# Patient Record
Sex: MALE | Employment: FULL TIME | URBAN - METROPOLITAN AREA
[De-identification: names, ages, dates, MRNs, and addresses within clinical notes are randomized per-mention and may not be internally consistent; named-entity substitution may affect disease eponyms.]

---

## 2019-04-09 ENCOUNTER — OFFICE VISIT (OUTPATIENT)
Dept: FAMILY MEDICINE | Facility: CLINIC | Age: 64
End: 2019-04-09
Payer: COMMERCIAL

## 2019-04-09 VITALS
BODY MASS INDEX: 27.84 KG/M2 | DIASTOLIC BLOOD PRESSURE: 82 MMHG | WEIGHT: 177.4 LBS | RESPIRATION RATE: 16 BRPM | HEART RATE: 84 BPM | HEIGHT: 67 IN | SYSTOLIC BLOOD PRESSURE: 126 MMHG | TEMPERATURE: 97.9 F | OXYGEN SATURATION: 99 %

## 2019-04-09 DIAGNOSIS — M72.0 DUPUYTREN'S CONTRACTURE OF HAND: ICD-10-CM

## 2019-04-09 DIAGNOSIS — Z87.891 PERSONAL HISTORY OF TOBACCO USE: ICD-10-CM

## 2019-04-09 DIAGNOSIS — Z00.01 ENCOUNTER FOR ROUTINE ADULT MEDICAL EXAM WITH ABNORMAL FINDINGS: Primary | ICD-10-CM

## 2019-04-09 DIAGNOSIS — B18.2 CHRONIC HEPATITIS C WITHOUT HEPATIC COMA (H): ICD-10-CM

## 2019-04-09 DIAGNOSIS — Z13.6 SCREENING FOR AAA (ABDOMINAL AORTIC ANEURYSM): ICD-10-CM

## 2019-04-09 LAB
ALBUMIN SERPL-MCNC: 3.8 G/DL (ref 3.2–4.5)
ALP SERPL-CCNC: 68 U/L (ref 40–150)
ALT SERPL-CCNC: 23 U/L (ref 0–45)
AST SERPL-CCNC: 27 U/L (ref 0–55)
BILIRUB SERPL-MCNC: 0.5 MG/DL (ref 0.2–1.3)
BUN SERPL-MCNC: 33 MG/DL (ref 7–30)
CALCIUM SERPL-MCNC: 9.8 MG/DL (ref 8.5–10.4)
CHLORIDE SERPLBLD-SCNC: 105 MMOL/L (ref 94–109)
CHOLEST SERPL-MCNC: 268 MG/DL
CHOLEST/HDLC SERPL: 6.1 RATIO
CO2 SERPL-SCNC: 30 MMOL/L (ref 20–32)
CREAT SERPL-MCNC: 1.9 MG/DL (ref 0.8–1.5)
EGFR CALCULATED (BLACK REFERENCE): 46 ML/MIN
EGFR CALCULATED (NON BLACK REFERENCE): 38 ML/MIN
GLUCOSE SERPL-MCNC: 109 MG/DL (ref 60–109)
HDLC SERPL-MCNC: 44 MG/DL
HIV 1+2 AB+HIV1 P24 AG SERPL QL IA: NEGATIVE
LDLC SERPL CALC-MCNC: 184 MG/DL (ref 0–99)
POTASSIUM SERPL-SCNC: 4.7 MMOL/L (ref 3.4–5.3)
PROT SERPL-MCNC: 7.8 G/DL (ref 6.6–8.8)
SODIUM SERPL-SCNC: 141 MMOL/L (ref 133–144)
TRIGL SERPL-MCNC: 201 MG/DL
VLDL-CHOLESTEROL: 40 MG/DL (ref 7–32)

## 2019-04-09 RX ORDER — DOXEPIN HYDROCHLORIDE 150 MG/1
10-20 CAPSULE ORAL
COMMUNITY
Start: 2019-04-09

## 2019-04-09 RX ORDER — DULOXETIN HYDROCHLORIDE 60 MG/1
120 CAPSULE, DELAYED RELEASE ORAL DAILY
COMMUNITY
Start: 2019-04-09

## 2019-04-09 ASSESSMENT — MIFFLIN-ST. JEOR: SCORE: 1548.31

## 2019-04-09 NOTE — PATIENT INSTRUCTIONS
Preventive Health Recommendations:     See your health care provider every year to    Review health changes.     Discuss preventive care.      Review your medicines if your doctor has prescribed any.      Talk with your health care provider about whether you should have a test to screen for prostate cancer (PSA).    Every 3 years, have a diabetes test (fasting glucose). If you are at risk for diabetes, you should have this test more often.    Every 5 years, have a cholesterol test. Have this test more often if you are at risk for high cholesterol or heart disease.     Every 10 years, have a colonoscopy. Or, have a yearly FIT test (stool test). These exams will check for colon cancer.    Talk to with your health care provider about screening for Abdominal Aortic Aneurysm if you have a family history of AAA or have a history of smoking.    Shots:     Get a flu shot each year.     Get a tetanus shot every 10 years.     Talk to your doctor about your pneumonia vaccines. There are now two you should receive - Pneumovax (PPSV 23) and Prevnar (PCV 13).     Talk to your pharmacist about a shingles vaccine.     Talk to your doctor about the hepatitis B vaccine.  Nutrition:     Eat at least 5 servings of fruits and vegetables each day.     Eat whole-grain bread, whole-wheat pasta and brown rice instead of white grains and rice.     Get adequate Calcium and Vitamin D.   Lifestyle    Exercise for at least 150 minutes a week (30 minutes a day, 5 days a week). This will help you control your weight and prevent disease.     Limit alcohol to one drink per day.     No smoking.     Wear sunscreen to prevent skin cancer.    See your dentist every six months for an exam and cleaning.    See your eye doctor every 1 to 2 years to screen for conditions such as glaucoma, macular degeneration, cataracts, etc.    Personalized Prevention Plan  You are due for the preventive services outlined below.  Your care team is available to assist you  in scheduling these services.  If you have already completed any of these items, please share that information with your care team to update in your medical record.  Health Maintenance Due   Topic Date Due     Depression Assessment 2 - yearly  03/05/1966     HIV SCREEN (SYSTEM ASSIGNED)  03/05/1972     Hepatitis C Screening  03/05/1972     Diptheria Tetanus Pertussis (DTAP/TDAP/TD) Vaccine (1 - Tdap) 03/05/1979     Cholesterol Lab - every 5 years  03/05/1989     Colon Cancer Screening - every 10 years.  03/05/2004     Zoster (Shingles) Vaccine (1 of 2) 03/05/2004     Discuss Advance Directive Planning  03/05/2009     Flu Vaccine (1) 09/01/2018     Annual Wellness Visit  03/05/2019     FALL RISK ASSESSMENT  03/05/2019     Pneumococcal Vaccine (1 of 2 - PCV13) 03/05/2019     AORTIC ANEURYSM SCREENING (SYSTEM ASSIGNED)  03/05/2019     Lung Cancer Screening   Frequently Asked Questions  If you are at high-risk for lung cancer, getting screened with low-dose computed tomography (LDCT) every year can help save your life. This handout offers answers to some of the most common questions about lung cancer screening. If you have other questions, please call [phone] ([phone]).     What is it?  Lung cancer screening uses special X-ray technology to create an image of your lung tissue. The exam is quick and easy and takes less than 10 seconds. We don t give you any medicine or use any needles. You can eat before and after the exam. You don t need to change your clothes as long as the clothing on your chest doesn t contain metal. But, you do need to be able to hold your breath for at least 6 seconds during the exam.    What is the goal of lung cancer screening?  The goal of lung cancer screening is to save lives. Many times, lung cancer is not found until a person starts having physical symptoms. Lung cancer screening can help detect lung cancer in the earliest stages when it may be easier to treat.    Who should  be screened for lung cancer?  We suggest lung cancer screening for anyone who is at high-risk for lung cancer. You are in the high-risk group if you:      are between the ages of 55 and 79, and    have smoked at least 1 pack of cigarettes a day for 30 or more years, and    still smoke or have quit within the past 15 years.    However, if you have a new cough or shortness of breath, you should talk to your doctor before being screened.    Some national lung health advocacy groups also recommend screening for people ages 50 to 79 who have smoked an average of 1 pack of cigarettes a day for 20 years. They must also have at least 1 other risk factor for lung cancer, not including exposure to secondhand smoke. Other risk factors are having had cancer in the past, emphysema, pulmonary fibrosis, COPD, a family history of lung cancer, or exposure to certain materials such as arsenic, asbestos, beryllium, cadmium, chromium, diesel fumes, nickel, radon or silica. Your care team can help you know if you have one of these risk factors.     Why does it matter if I have symptoms?  Certain symptoms can be a sign that you have a condition in your lungs that should be checked and treated by your doctor. These symptoms include fever, chest pain, a new or changing cough, shortness of breath that you have never felt before, coughing up blood or unexplained weight loss. Having any of these symptoms can greatly affect the results of lung cancer screening.       Should all smokers get an LDCT lung cancer screening exam?  It depends. Lung cancer screening is for a very specific group of men and women who have a history of heavy smoking over a long period of time (see  Who should be screened for lung cancer  above).  I am in the high-risk group, but have been diagnosed with cancer in the past. Is LDCT lung cancer screening right for me?  In some cases, you should not have LDCT lung screening, such as when your doctor is already following  your cancer with CT scan studies. Your doctor will help you decide if LDCT lung screening is right for you.  Do I need to have a screening exam every year?  Yes. If you are in the high-risk group described earlier, you should get an LDCT lung cancer screening exam every year until you are 79, or are no longer willing or able to undergo screening and possible procedures to diagnose and treat lung cancer.  How effective is LDCT at preventing death from lung cancer?  Studies have shown that LDCT lung cancer screening can lower the risk of death from lung cancer by 20 percent in people who are at high-risk.  What are the risks?  There are some risks and limitations of LDCT lung cancer screening. We want to make sure you understand the risks and benefits, so please let us know if you have any questions. Your doctor may want to talk with you more about these risks.    Radiation exposure: As with any exam that uses radiation, there is a very small increased risk of cancer. The amount of radiation in LDCT is small--about the same amount a person would get from a mammogram. Your doctor orders the exam when he or she feels the potential benefits outweigh the risks.    False negatives: No test is perfect, including LDCT. It is possible that you may have a medical condition, including lung cancer, that is not found during your exam. This is called a false negative result.    False positives and more testing: LDCT very often finds something in the lung that could be cancer, but in fact is not. This is called a false positive result. False positive tests often cause anxiety. To make sure these findings are not cancer, you may need to have more tests. These tests will be done only if you give us permission. Sometimes patients need a treatment that can have side effects, such as a biopsy. For more information on false positives, see  What can I expect from the results?     Findings not related to lung cancer: Your LDCT exam also  takes pictures of areas of your body next to your lungs. In a very small number of cases, the CT scan will show an abnormal finding in one of these areas, such as your kidneys, adrenal glands, liver or thyroid. This finding may not be serious, but you may need more tests. Your doctor can help you decide what other tests you may need, if any.  What can I expect from the results?  About 1 out of 4 LDCT exams will find something that may need more tests. Most of the time, these findings are lung nodules. Lung nodules are very small collections of tissue in the lung. These nodules are very common, and the vast majority--more than 97 percent--are not cancer (benign). Most are normal lymph nodes or small areas of scarring from past infections.  But, if a small lung nodule is found to be cancer, the cancer can be cured more than 90 percent of the time. To know if the nodule is cancer, we may need to get more images before your next yearly screening exam. If the nodule has suspicious features (for example, it is large, has an odd shape or grows over time), we will refer you to a specialist for further testing.  Will my doctor also get the results?  Yes. Your doctor will get a copy of your results.  Is it okay to keep smoking now that there s a cancer screening exam?  No. Tobacco is one of the strongest cancer-causing agents. It causes not only lung cancer, but other cancers and cardiovascular (heart) diseases as well. The damage caused by smoking builds over time. This means that the longer you smoke, the higher your risk of disease. While it is never too late to quit, the sooner you quit, the better.  Where can I find help to quit smoking?  The best way to prevent lung cancer is to stop smoking. If you have already quit smoking, congratulations and keep it up! For help on quitting smoking, please call [employer] at 8-289-875-QQBN (9431) or the American Cancer Society at 1-750.855.9148 to find local resources near  you.  One-on-one health coaching:  If you d prefer to work individually with a health care provider on tobacco cessation, we offer:      Medication Therapy Management:  Our specially trained pharmacists work closely with you and your doctor to help you quit smoking.  Call [phone] or [phone] (toll free).     Can Do: Health coaching offered by [facility].  [email or web address]

## 2019-04-09 NOTE — PROGRESS NOTES
Preceptor Attestation:   Patient seen, evaluated and discussed with the resident. I have verified the content of the note, which accurately reflects my assessment of the patient and the plan of care.  Supervising Physician:Jose Suarez MD  [facility]

## 2019-04-09 NOTE — PROGRESS NOTES
Male Physical Note    Concerns today: Left hand soreness in the palm, going on for a long time. No medicines or surgery.    Also, hasn't had sex in 5-6 years. States that ever since he put a gun to the head and pulled the trigger he can't get an erection. No erection when he wakes up, but he also states he doesn't sleep very well.    Used to be on BP medications, but then his BP went too low and he passed out in a parking lot.    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    2 past suicide attempts, last 5-6 years ago and the first was in the 70s. [facility] - no recent medication changes. Sees them monthly for the below.    Used to be a heroin addict, [facility].    Has quit cigarettes in the past.    Patient with a history of HCV, was treated and told he was cured.    Lung Cancer Screening Shared Decision Making Visit     Arley Marrero is eligible for lung cancer screening on the basis of the information provided in my signed lung cancer screening order.     I have discussed with patient the risks and benefits of screening for lung cancer with low-dose CT.     The risks include:  radiation exposure: one low dose chest CT has as much ionizing radiation as about 15 chest x-rays or 6 months of background radiation living in [place]    false positives: 96% of positive findings/nodules are NOT cancer, but some might still require additional diagnostic evaluation, including biopsy  over-diagnosis: some slow growing cancers that might never have been clinically significant will be detected and treated unnecessarily     The benefit of early detection of lung cancer is contingent upon adherence to annual screening or more frequent follow up if indicated.     Furthermore, reaping the benefits of screening requires Arley Marrero to be willing and physically able to undergo diagnostic procedures, if indicated.  Although no specific guide is available for determining severity of comorbidities, it is reasonable to withhold screening in patients who have greater mortality risk from other diseases.     We did discuss that the only way to prevent lung cancer is to not smoke. Smoking cessation assistance was offered.    I did not offer risk estimation using a calculator such as this one:    ROS:                      CONSTITUTIONAL: no fatigue, no unexpected change in weight  SKIN: no worrisome rashes, no worrisome moles, no worrisome lesions  EYES: no acute vision problems or changes  ENT: no ear problems, no mouth problems, no throat problems  RESP: no significant cough, no shortness of breath  CV: no chest pain, no palpitations, no new or worsening peripheral edema  GI: no nausea, no vomiting, no constipation, no diarrhea    Past Medical History:   Diagnosis Date     Heroin abuse (H)      Suicide attempt (H)      History reviewed. No pertinent family history.             Family History and past Medical History reviewed and unchanged/updated.    Social History     Tobacco Use     Smoking status: Current Every Day Smoker     Smokeless tobacco: Never Used   Substance Use Topics     Alcohol use: Not on file       Children ? yes Tracy (40) and Balbina (39)    Has anyone hurt you physically, for example by pushing, hitting, slapping or kicking you or forcing you to have sex? Denies  Do you feel threatened or controlled by a partner, ex-partner or anyone in your life? Denies    RISK BEHAVIORS AND HEALTHY HABITS:  Tobacco Use/Smoking: Currently smokes 1.5 packs per week  Illicit Drug Use: None  ETOH: None  Do you use alcohol? No  Sexually Active: No  Diet (5-7 servings of fruits/veg daily): No   Exercise (30 min accumulated most days):No  Dental Care: last went last year, needs some teeth pulled   Calcium 1500 mg/d:  No  Seat Belt Use: Yes     Cholesterol Level (>46 yo or at risk):  Recommended and patient accepted testing. and  "HIV screening:  Date done 1980s-1990s  Result(s) Negative  Colon CA Screening (>50-75 ):  Recommended and patient declined testing., US for AAA (men 65-74 yo who ever smoked):  Recommended and patient accepted testing. and Lung CA Screening with low dose CT (55 - 80, with >= 30 ppy smoking history who are current smokers or quit within last 15y - annual low dose CT) Recommended and patient accepted testing.    Patient unsure of immunization history, [facility]    Immunization History   Administered Date(s) Administered     Influenza (IIV3) PF 10/13/2009, 11/29/2010, 09/30/2011     Pneumococcal 23 valent 10/13/2009     TDAP Vaccine (Boostrix) 05/18/2011     Tdap (Adult) Unspecified Formulation 05/01/2007      EXAMINATION:  /82   Pulse 84   Temp 97.9  F (36.6  C) (Oral)   Resp 16   Ht 1.702 m (5' 7\")   Wt 80.5 kg (177 lb 6.4 oz)   SpO2 99%   BMI 27.78 kg/m    GENERAL: healthy, alert and no distress  EYES: Eyes grossly normal to inspection, extraocular movements - intact, and PERRL  HENT: ear canals- normal; TMs- normal; Nose- normal; Mouth- no ulcers, no lesions  RESP: lungs clear to auscultation - no rales, no rhonchi, no wheezes  CV: regular rates and rhythm, normal S1 S2, no S3 or S4 and no murmur, no click or rub -  ABDOMEN: soft, no tenderness, no  hepatosplenomegaly, no masses, normal bowel sounds  MS: extremities- Dupuytren's contracture L hand 3rd finger, no other gross deformities noted, no edema  SKIN: no suspicious lesions, no rashes  NEURO: strength and tone- normal, sensory exam- grossly normal, mentation- intact, speech- normal, reflexes- symmetric  : declined  PSYCH: Alert and oriented times 3; speech- coherent , normal rate and volume; able to articulate logical thoughts, able to abstract reason, no tangential thoughts, no hallucinations or delusions, affect- normal    ASSESSMENT/PLAN:  1. Encounter for routine adult medical exam with abnormal " findings  Patient believes he has never had a lipid panel, last tested for HIV in the 1990s although he used IV heroin since.  - Lipid Panel ([facility]) - Results < 1 hr  - HIV Ag/Ab Screen [facility] ([facility])    2. Dupuytren's contracture of hand  Not interested in injection or surgery at this time, will continue to follow.    3. Chronic hepatitis C without hepatic coma (H)  Patient states he got from IV heroin and was treated, will confirm this with the below.  - Hepatits C RNA by RT-PCR ([facility])  - Hepatitis C Antibody ([facility])  - Comprehensive Metabolic Panel ([facility]) - results <1hr Protocol    4. Screening for AAA (abdominal aortic aneurysm)  Meets screening criteria.  - Abdominal Aortic Aneurysm Screening/Tracking    5. Personal history of tobacco use  Meets screening criteria for lung cancer, not interested in quitting today - has quit successfully in the past.  - Prof fee: Shared Decisionmaking for Lung Cancer Screening  - CT Chest Lung Cancer Scrn Low Dose wo; Future  - Okay for Smoking Cessation Study (PLUTO) to Contact Patient    F/U in 1 month    Stefan Salgado MD  [facility], PGY-2    Precepted with Dr. Suarez.

## 2019-04-09 NOTE — LETTER
April 12, 2019      Arley Marrero  [id number]  [place] [address]        Dear Arley,    Your Hepatitis C tests show that you did indeed have the disease, but were successfully treated. Your HIV test was negative. Your kidney function is reduced, but this has been stable since 2017. Your cholesterol was elevated and I would recommend starting a medication to lower this which I have sent to your pharmacy. We can discuss this further at your next visit.    Please see below for your test results.    Resulted Orders   Lipid Panel ([facility]) - Results < 1 hr   Result Value Ref Range    Cholesterol 268.0 (H) <200.0 mg/dL    Triglycerides 201.0 (H) <150.0 mg/dL    HDL Cholesterol 44.0 >40.0 mg/dL      Comment:      If diabetic or CVD then reference range <100    VLDL-Cholesterol 40.0 (H) 7.0 - 32.0 mg/dL    LDL Cholesterol Direct 184.0 (H) 0.0 - 99.0 mg/dL    Cholesterol/HDL Ratio 6.1 (H) <5.0 RATIO   HIV Ag/Ab Screen [facility] (Apliiq)   Result Value Ref Range    HIV Antigen/Antibody Negative Negative    Narrative    Test performed by:  [facility] [address] [place] [address]  Method is Abbott HIV Ag/Ab for the detection of HIV p24 antigen, HIV-1   antibodies and HIV-2 antibodies.   Hepatits C RNA by RT-PCR ([facility]iiq)   Result Value Ref Range    HCV RNA QUANT HCV RNA Not Detected HCVND [IU]/mL      Comment:      The DAVE AmpliPrep/DAVE TaqMan HCV Test is an FDA-approved in vitro nucleic  acid amplification test for the quantitation of HCV RNA in human plasma (ETDA  plasma) or serum using the DAVE AmpliPrep Instrument for automated viral  nucleic acid extraction and the DAVE TaqMan Analyzer or DAVE TaqMan for  automated Real Time PCR amplification and detection of the viral nucleic acid  target.  Titer results are reported in International Units/mL (IU/mL) using the 1st WHO  International standard for HCV for Nucleic Acid Amplification based assays.      LOG OF HCV RNA QT Not  Calculated <1.2 Log IU/mL      Comment:      Performed and/or entered by:  [facility]  [address] [place] [address]      Narrative    Test performed by:  [facility]  [address], [place] [address]   Hepatitis C Antibody ([facility])   Result Value Ref Range    Hepatitis C Antibody Screen Positive (A) Negative    Narrative    Test performed by:  [facility]  [address] [place] [address]   Comprehensive Metabolic Panel ([facility]) - results <1hr Protocol   Result Value Ref Range    Glucose 109.0 60.0 - 109.0 mg/dL    Urea Nitrogen 33.0 (H) 7.0 - 30.0 mg/dL    Creatinine 1.9 (H) 0.8 - 1.5 mg/dL    Sodium 141.0 133.0 - 144.0 mmol/L    Potassium 4.7 3.4 - 5.3 mmol/L    Chloride 105.0 94.0 - 109.0 mmol/L    Carbon Dioxide 30.0 20.0 - 32.0 mmol/L    Calcium 9.8 8.5 - 10.4 mg/dL    Protein Total 7.8 6.6 - 8.8 g/dL    Albumin 3.8 3.2 - 4.5 g/dL    Alkaline Phosphatase 68.0 40.0 - 150.0 U/L    ALT 23.0 0.0 - 45.0 U/L    AST 27.0 0.0 - 55.0 U/L    Bilirubin Total 0.5 0.2 - 1.3 mg/dL    eGFR Calculated (Non Black Reference) 38.0 (L) >60.0 mL/min    eGFR Calculated (Black Reference) 46.0 (L) >60.0 mL/min       If you have any questions, please call the clinic to make an appointment.    Sincerely,    Stefan Salgado MD

## 2019-04-10 LAB — HCV AB SER QL: POSITIVE

## 2019-04-11 LAB
HCV RNA QUANT: NORMAL [IU]/ML
LOG OF HCV RNA QT: NORMAL LOG IU/ML

## 2019-04-12 DIAGNOSIS — E78.5 HYPERLIPIDEMIA LDL GOAL <70: Primary | ICD-10-CM

## 2019-04-12 RX ORDER — ATORVASTATIN CALCIUM 40 MG/1
40 TABLET, FILM COATED ORAL DAILY
Qty: 90 TABLET | Refills: 0 | Status: SHIPPED | OUTPATIENT
Start: 2019-04-12 | End: 2019-07-18

## 2019-04-12 NOTE — RESULT ENCOUNTER NOTE
Please mail a letter to the patient with the following:    Mr. Marrero,    Below are your lab results from your recent visit. Your Hepatitis C tests show that you did indeed have the disease, but were successfully treated. Your HIV test was negative. Your kidney function is reduced, but this has been stable since 2017. Your cholesterol was elevated and I would recommend starting a medication to lower this which I have sent to your pharmacy. We can discuss this further at your next visit.    If you have any further questions or concerns, please do not hesitate to reach out to my office.    I look forward to seeing you in the future!    Take care,  Stefan Salgado MD

## 2019-04-18 ENCOUNTER — TELEPHONE (OUTPATIENT)
Dept: FAMILY MEDICINE | Facility: CLINIC | Age: 64
End: 2019-04-18

## 2019-04-18 NOTE — TELEPHONE ENCOUNTER
Called medication into [facility] pharmacy per patient request. Called patient to update on prescription sent, no answer. Left  updating on prescription sent to pharmacy, requested call into clinic with questions or concerns. Alex MAJOR

## 2019-04-18 NOTE — TELEPHONE ENCOUNTER
[facility] phone call message- medication clarification/question:    Full Medication Name: lipitor   Dose: 40mg    Question: Patient has insurance that does not cover [facility]. Patient wants medication to be sent to [facility] on [address] and [address]     Pharmacy confirmed as [id number] - [place], [place] - [address]: No    OK to leave a message on voice mail? Yes    Primary language: English      needed? No    Call taken on April 18, 2019 at 4:20 PM by Thien Meyer

## 2019-04-18 NOTE — TELEPHONE ENCOUNTER
Patient was returning call for MORIAH Martin. I notified the patient of message from MORIAH Martin. Patient states the pharmacy she mentioned is correct and he would like all future medications to be sent to [facility].

## 2019-04-27 ENCOUNTER — ANCILLARY PROCEDURE (OUTPATIENT)
Dept: CT IMAGING | Facility: CLINIC | Age: 64
End: 2019-04-27
Attending: FAMILY MEDICINE
Payer: COMMERCIAL

## 2019-04-27 DIAGNOSIS — Z87.891 PERSONAL HISTORY OF TOBACCO USE: ICD-10-CM

## 2019-04-27 NOTE — LETTER
2019      Arley Marrero  [id number]  [place] [address]        Dear Arley,    Below are your imaging results from your CT scan. It was negative for cancer, but you did have a lung nodule that should be followed up with a repeat CT scan of your chest in 6 months. We can discuss these results further at your next visit.    Please see below for your test results.    Resulted Orders   CT Chest Lung Cancer Scrn Low Dose wo   Result Value Ref Range    Radiologist flags Lung nodule     Narrative    CT Low Dose Lung Cancer Screening    History: Screening for lung cancer, smoking.    Number of packs-year of smokin  Current or former smoker?: Current  If former, number of years since quit?: N/A    Comparison: None    Technique: Flash acquisition low dose CT chest. Images reviewed in  lung, soft tissue and bone windows.  DLP: 84 (mGy*cm)  CTDIvol: 2.2 (mGy)    Findings: [All follow up of nodules are based on ACR guidelines for  lung cancer screening and measurements of each nodule size must be the  mean of the longest 2 axial plane perpendiculars]  Nodules: ~10  The largest and/or fastest 4 of these nodule(s) are as follows:    - 20 x 20 mm part solid-part ground glass (sizes of both solid and gg  component) nodule in the right upper lobe with the solid component  measuring 5 x 3 mm on series:  2 image:  129.    - 12 x 11 mm ground glass nodule in the right middle lobe on series:   2 image:  232.    - 11 x 10 mm ground glass nodule in the right middle lobe on series:   2 image:  219.    - 4 x 3 mm solid nodule left upper lobe on series 2 image 147    Emphysema: Minimal centrilobular emphysema.    Mosaic attenuation: No    Bronchial wall thickening: Mild.    Bronchiectasis: No    Coronary artery calcium: Minimal calcium in the proximal left anterior  descending coronary artery.    Other portions of the chest: Diffuse centrilobular groundglass  opacities. No pneumothorax or pleural effusion. Small  "amount of  layering debris within the trachea. No pericardial effusion. Normal  caliber of the thoracic aorta.     Lymph nodes: No enlarged thoracic lymph nodes by size criteria.    Upper abdomen: Postoperative changes from cholecystectomy. Partial  pancreatic lipomatosis.     Bones: No worrisome osseous abnormality.      Impression    Impression:   1. ACR Assessment Category:  Lung-RADS Category 3. Probably benign  finding(s)     Recommendation:  Lung-RADS Category 3. Probably benign finding(s)-  short term follow up suggested 6 month low dose CT Chest without  contrast (please order exam code [id number]).      2. Significant Incidental Finding(s):  Category S: Yes.  a.  other interstitial lung disease (specify type if known): smoking  related interstitial lung disease, favor respiratory bronchiolitis.    3. Prior history of Lung cancer:  Category C: no.    4. Avoidance of tobacco smoke is strongly advised. Please consider  referral for smoking cessation to [facility] Medication Therapy Management  (MTM) if clinically appropriate.      Download the \"LungRADS Assessment Categories\" table at this site:   http://www.acr.org/Quality-Safety/Resources/LungRADS      [Recommend Follow Up: Lung nodule]    This report will be copied to the [facility] to ensure a  provider acknowledges the finding.     I have personally reviewed the examination and initial interpretation  and I agree with the findings.    ANIYAH SOUSA MD       If you have any questions, please call the clinic to make an appointment.    Sincerely,    [facility] CT ROOM 1  "

## 2019-04-29 ENCOUNTER — TELEPHONE (OUTPATIENT)
Dept: FAMILY MEDICINE | Facility: CLINIC | Age: 64
End: 2019-04-29

## 2019-04-29 LAB — RADIOLOGIST FLAGS: NORMAL

## 2019-04-29 NOTE — TELEPHONE ENCOUNTER
Deann will also fax to me final report of CT chest low dose lung cancer screening. Final result- right, mid lobe nodule. Recommendation- follow up in 6 months with low dose CT of chest without contrast. Message routed to Dr Salgado for review. Sunil MAJOR

## 2019-04-29 NOTE — RESULT ENCOUNTER NOTE
Please mail a letter to the patient with the following:    Arley,    Below are your imaging results from your CT scan. It was negative for cancer, but you did have a lung nodule that should be followed up with a repeat CT scan of your chest in 6 months. We can discuss these results further at your next visit.    If you have any further questions or concerns, please do not hesitate to reach out to my office.    I look forward to seeing you in the future!    Take care,  Stefan Salgado MD

## 2019-04-30 ENCOUNTER — TELEPHONE (OUTPATIENT)
Dept: FAMILY MEDICINE | Facility: CLINIC | Age: 64
End: 2019-04-30

## 2019-04-30 DIAGNOSIS — R91.8 ABNORMAL CT LUNG SCREENING: ICD-10-CM

## 2019-04-30 NOTE — TELEPHONE ENCOUNTER
[facility] Radiology Lung Cancer Screening CT result notification:     LDCT/Lung Cancer Screening CT Exam date: 4/27/19  Radiologist Impression AND Recommendations:   ACR Assessment Category:  Lung-RADS Category 3. Probably benign finding(s)   Recommendation:  Lung-RADS Category 3. Probably benign finding(s)- short term follow up suggested 6 month low dose CT Chest without contrast (please order exam code [id number]).     Pertinent Findings:  Nodules: ~10  The largest and/or fastest 4 of these nodule(s) are as follows:  - 20 x 20 mm part solid-part ground glass (sizes of both solid and gg component) nodule in the right upper lobe with the solid component measuring 5 x 3 mm on series:  2 image:  129.  - 12 x 11 mm ground glass nodule in the right middle lobe on series: 2 image:  232.  - 11 x 10 mm ground glass nodule in the right middle lobe on series: 2 image:  219.  - 4 x 3 mm solid nodule left upper lobe on series 2 image 147     Ordering Provider: Jose Suarez MD Michael R Stennett did receive the remaining radiology results from her provider.   CT Chest order placed to be completed within 6 months (Yes/No):  Yes   RN communicated the lung nodule finding to the patient (Yes/No):  No, Left message requesting call back  RN ordered Lung nodule program referral (Yes/No/NA): No  The patient had the following questions: NA  Correct letter sent as per Lung nodule protocol (Yes/No):  Yes  Did Patient have any CT's of chest previous? (inquire only if no CT's were used for comparison) (Yes/No/NA) no comparison's used    If scheduling LDCT only, RN will contact Image Scheduling Team  Hours available (all sites below):  Mon-Fri 7A to 8P; Sat 7A to 3P.  No schedulers available on Sunday.    [facility] ([place] and [place]): [phone]    [place] ([place], [place], [place]): [phone]    South region ([facility]): [phone]    Left voicemail message requesting a call back to [phone]  between 10 a.m. and 6:30 p.m. to discuss radiology finding.    [facility] RN  Lung Nodule and ED Lab Result F/u RN  Ph# [phone]

## 2019-04-30 NOTE — TELEPHONE ENCOUNTER
Arley notified of CT result and recommendation.  CT order placed (due on or after 10/30/19) and scheduling will call him 1 month prior to schedule this CT.    Arley has no other questions at this time.  Letter about Lung nodules has been sent and he is aware of this letter.,    Amrit Chisholm RN  [facility] RN  Lung Nodule and ED Lab Result RN  Epic pool (ED late result f/u RN): [id number]  [facility] INCIDENTAL RADIOLOGY F/U NURSES: P [id number]  # [phone]

## 2019-05-15 ENCOUNTER — OFFICE VISIT (OUTPATIENT)
Dept: FAMILY MEDICINE | Facility: CLINIC | Age: 64
End: 2019-05-15
Payer: COMMERCIAL

## 2019-05-15 ENCOUNTER — RECORDS - HEALTHEAST (OUTPATIENT)
Dept: ADMINISTRATIVE | Facility: OTHER | Age: 64
End: 2019-05-15

## 2019-05-15 VITALS
SYSTOLIC BLOOD PRESSURE: 108 MMHG | TEMPERATURE: 97 F | BODY MASS INDEX: 27.78 KG/M2 | HEART RATE: 89 BPM | WEIGHT: 177 LBS | OXYGEN SATURATION: 97 % | RESPIRATION RATE: 20 BRPM | DIASTOLIC BLOOD PRESSURE: 76 MMHG | HEIGHT: 67 IN

## 2019-05-15 DIAGNOSIS — E78.5 HYPERLIPIDEMIA LDL GOAL <70: ICD-10-CM

## 2019-05-15 DIAGNOSIS — F33.42 RECURRENT MAJOR DEPRESSIVE DISORDER, IN FULL REMISSION (H): ICD-10-CM

## 2019-05-15 DIAGNOSIS — Z13.6 ENCOUNTER FOR ABDOMINAL AORTIC ANEURYSM (AAA) SCREENING: ICD-10-CM

## 2019-05-15 DIAGNOSIS — Z72.0 TOBACCO ABUSE: ICD-10-CM

## 2019-05-15 DIAGNOSIS — R91.8 PULMONARY NODULES: Primary | ICD-10-CM

## 2019-05-15 PROBLEM — G47.00 INSOMNIA, UNSPECIFIED TYPE: Status: ACTIVE | Noted: 2019-05-15

## 2019-05-15 PROBLEM — K59.00 CONSTIPATION, UNSPECIFIED CONSTIPATION TYPE: Status: ACTIVE | Noted: 2019-05-15

## 2019-05-15 RX ORDER — SENNA AND DOCUSATE SODIUM 50; 8.6 MG/1; MG/1
1 TABLET, FILM COATED ORAL 2 TIMES DAILY PRN
COMMUNITY
Start: 2019-05-15

## 2019-05-15 RX ORDER — OMEPRAZOLE 20 MG/1
40 TABLET, DELAYED RELEASE ORAL DAILY
COMMUNITY
Start: 2019-05-15

## 2019-05-15 RX ORDER — DIPHENHYDRAMINE HCL 50 MG
50 CAPSULE ORAL
COMMUNITY
Start: 2019-05-15

## 2019-05-15 ASSESSMENT — PATIENT HEALTH QUESTIONNAIRE - PHQ9: SUM OF ALL RESPONSES TO PHQ QUESTIONS 1-9: 4

## 2019-05-15 ASSESSMENT — MIFFLIN-ST. JEOR: SCORE: 1545.37

## 2019-05-15 NOTE — PROGRESS NOTES
Preceptor Attestation:   Patient seen, evaluated and discussed with the resident. I have verified the content of the note, which accurately reflects my assessment of the patient and the plan of care.  Supervising Physician:Shivani Santos MD  [facility]

## 2019-05-15 NOTE — PATIENT INSTRUCTIONS
Referral for ( TEST )  :      US AAA Screening   LOCATION/PLACE/Provider :    [facility]   DATE & TIME :     5- at 11:00  PHONE :     [phone]  FAX :     [phone]  Appointment made by clinic staff/:    Renata

## 2019-05-15 NOTE — PROGRESS NOTES
Assessment and Plan   1. Pulmonary nodules  F/u imaging ordered.  - CT Chest w/o Contrast; Future    2. Encounter for abdominal aortic aneurysm (AAA) screening  - [place] [employer]ORTA MEDICARE AAA SCREENING; Future    3. Recurrent major depressive disorder, in full remission (H)  Currently stable, PHQ-9 of 3, JADYN-7 of 0.    4. Tobacco abuse  See HPI.    5. Hyperlipidemia LDL goal <70  Recheck cholesterol at next visit.    Follow up in 3 months.    Options for treatment and follow-up care were reviewed with the patient and/or guardian. Arley Marrero and/or guardian engaged in the decision making process and verbalized understanding of the options discussed and agreed with the final plan.    Stefan Salgado MD  [facility], PGY-2    Precepted with: Dr. Santos.         HPI:   Arley Marrero is a 64 year old male who presents to clinic today for   Chief Complaint   Patient presents with     Medication Reconciliation     Results     Patient feels his mood is stable, no SI/HI. [facility] Unsure when his next appointment is, maybe August.    Smoking ~5 cigarettes a day, but it depends. He is cutting down. He has quit in the past for years at a time and intends to quit again. He used to smoke more when he was drinking, but doesn't drink anymore. Just quit cold turkey. He denies medication or NRT to help. No date in mind.    He has never had a colonoscopy, but is thinking about it since his insurance will give him $25 if he gets it done.    He thinks he [facility].    We reviewed his CT results and labs from last time.         Review of Systems:     A comprehensive 12 point review of systems was negative unless otherwise noted in the HPI.          PMHX:   Active Problems List  Patient Active Problem List   Diagnosis     Viral hepatitis C     Tobacco abuse     Dupuytren's contracture of  "hand     Depression     Chronic lumbar pain     Abnormal CT lung screening     Pulmonary nodules     Active problem list reviewed and updated.    Current Medications  Current Outpatient Medications   Medication Sig Dispense Refill     atorvastatin (LIPITOR) 40 MG tablet Take 1 tablet (40 mg) by mouth daily 90 tablet 0     diphenhydrAMINE (BENADRYL) 50 MG capsule Take 1 capsule (50 mg) by mouth nightly as needed for sleep       doxepin HCl (SINEQUAN) 150 MG capsule Take 10-20 mg by mouth nightly as needed       DULoxetine (CYMBALTA) 60 MG capsule Take 2 capsules (120 mg) by mouth daily       methadone (DOLPHINE) 1 mg/mL SOLN Take 150 mLs (150 mg) by mouth daily  0     omeprazole (PRILOSEC OTC) 20 MG EC tablet Take 2 tablets (40 mg) by mouth daily       SENNA-docusate sodium (SENNA S) 8.6-50 MG tablet Take 1 tablet by mouth 2 times daily as needed       Medication list reviewed and updated.    Social History  Social History     Tobacco Use     Smoking status: Current Every Day Smoker     Packs/day: 0.10     Years: 50.00     Pack years: 5.00     Types: Cigarettes     Smokeless tobacco: Never Used   Substance Use Topics     Alcohol use: Not Currently     Drug use: Not Currently     History   Drug Use Unknown     Allergies  No Known Allergies  Allergies and Medication Intolerances Updated         Physical Exam:     Vitals:    05/15/19 1119   BP: 108/76   Pulse: 89   Resp: 20   Temp: 97  F (36.1  C)   TempSrc: Oral   SpO2: 97%   Weight: 80.3 kg (177 lb)   Height: 1.7 m (5' 6.93\")     Body mass index is 27.78 kg/m .    GENERAL APPEARANCE: alert, appears stated age, no acute distress  HEENT: Eyes grossly normal to inspection, nares normal  RESP: lungs clear to auscultation - no rales, rhonchi, or wheezes  CV: regular rate and rhythm, no murmur, click, rub, or gallop  ABDOMEN: soft, nontender   MSK: extremities normal, no gross deformities noted, no lower extremity edema  SKIN: no suspicious lesions or rashes   NEURO: Normal " strength and tone, sensory exam grossly normal, mentation appears intact and speech normal  PSYCH: mood and affect normal/bright

## 2019-05-17 ENCOUNTER — HOSPITAL ENCOUNTER (OUTPATIENT)
Dept: ULTRASOUND IMAGING | Facility: HOSPITAL | Age: 64
Discharge: HOME | End: 2019-05-17
Attending: STUDENT IN AN ORGANIZED HEALTH CARE EDUCATION/TRAINING PROGRAM

## 2019-05-17 DIAGNOSIS — Z13.6 ENCOUNTER FOR ABDOMINAL AORTIC ANEURYSM (AAA) SCREENING: ICD-10-CM

## 2019-06-03 ENCOUNTER — OFFICE VISIT (OUTPATIENT)
Dept: FAMILY MEDICINE | Facility: CLINIC | Age: 64
End: 2019-06-03
Payer: COMMERCIAL

## 2019-06-03 VITALS
RESPIRATION RATE: 20 BRPM | WEIGHT: 178.2 LBS | TEMPERATURE: 97.4 F | DIASTOLIC BLOOD PRESSURE: 76 MMHG | BODY MASS INDEX: 27.97 KG/M2 | HEIGHT: 67 IN | HEART RATE: 84 BPM | SYSTOLIC BLOOD PRESSURE: 116 MMHG | OXYGEN SATURATION: 98 %

## 2019-06-03 DIAGNOSIS — A69.20 LYME DISEASE: Primary | ICD-10-CM

## 2019-06-03 DIAGNOSIS — A69.20 ERYTHEMA MIGRANS (LYME DISEASE): ICD-10-CM

## 2019-06-03 RX ORDER — DOXYCYCLINE 100 MG/1
100 TABLET ORAL 2 TIMES DAILY
Qty: 42 TABLET | Refills: 0 | Status: SHIPPED | OUTPATIENT
Start: 2019-06-03 | End: 2019-06-28

## 2019-06-03 ASSESSMENT — PATIENT HEALTH QUESTIONNAIRE - PHQ9: SUM OF ALL RESPONSES TO PHQ QUESTIONS 1-9: 4

## 2019-06-03 ASSESSMENT — MIFFLIN-ST. JEOR: SCORE: 1551.94

## 2019-06-03 NOTE — PROGRESS NOTES
Preceptor Attestation:   Patient seen, evaluated and discussed with the resident. I have verified the content of the note, which accurately reflects my assessment of the patient and the plan of care.  Supervising Physician:Tavares Dillard MD  [facility]

## 2019-06-03 NOTE — PATIENT INSTRUCTIONS
Thank you for coming in today    Start the doxycycline 100mg twice a day. Take this for the next 21 days    Return to clinic if you have worsening headaches, neck stiffness, nausea and vomiting, chest pains or shortness of breath, or joint pains.    Otherwise return in about 4 weeks to see how the treatment has worked.

## 2019-06-03 NOTE — PROGRESS NOTES
Assessment and Plan       Arley Marrero is a 64 year old male with past medical hx including chronic pain syndrome who presented to clinic today for concern for lyme disease    Lyme disease  Erythema migrans  Consistent history with tick bite, exposure in [place]ed area in endemic area of [place], and appearance of erythema migrans. No evidence of further organ involvement such as cardiac or pulmonary rub or arthralgias; however, with new onset headache consider possibility of CNS involvement and will treat with extended course of 21 days. Follow up in 4 weeks for treatment effect or sooner for new symptoms.   - doxycycline monohydrate (ADOXA) 100 MG tablet  Dispense: 42 tablet; Refill: 0    Options for treatment and follow-up care were reviewed with the patient. Arley Marrero engaged in the decision making process and verbalized understanding of the options discussed and agreed with the final plan.    Benjamin Rosenstein, MD, [facility]  P: [phone]      Precepted today with: Tavares Dillard MD         HPI:       Chief Complaint   Patient presents with     Insect Bites     2 weeks ago      Medication Reconciliation     Completed       Arley Marrero is a 64 year old  male with hx of chronic pain syndrome, treated Hep C who presents for walk-in appointment regarding the following    Tick Bite, concern for lyme  -Tick bite, right arm  -Bit 2 weeks ago while [place] to go [place] near [place]  -Was spending time in the woods  -Saw small tick, may be 2 mm, brown  -Just pulled it off  -Has red, bulls-eye appearing palomo on arm  -Brother had lyme disease a few years ago, said looked exactly same, was in hospital for 4 days. Told him to see the doctors (yesterday)  -Having headache for the last week, unusual for him  -No new myalgias, arthralgias  -No fevers/chills, no nausea, vomiting  -No chest pain or SOB           Review of Systems:     5 point ROS negative except as noted above  "in HPI, including Gen., Resp, CV, GI &  system review.             PFSH:   Problem List   Patient Active Problem List   Diagnosis     Viral hepatitis C     Tobacco abuse     Dupuytren's contracture of hand     Depression     Chronic lumbar pain     Pulmonary nodules     Insomnia, unspecified type     Gastroesophageal reflux disease, esophagitis presence not specified     Constipation, unspecified constipation type     Hyperlipidemia LDL goal <70     Encounter for abdominal aortic aneurysm (AAA) screening        Medications   Current Outpatient Medications   Medication Sig Dispense Refill     atorvastatin (LIPITOR) 40 MG tablet Take 1 tablet (40 mg) by mouth daily 90 tablet 0     diphenhydrAMINE (BENADRYL) 50 MG capsule Take 1 capsule (50 mg) by mouth nightly as needed for sleep       doxepin HCl (SINEQUAN) 150 MG capsule Take 10-20 mg by mouth nightly as needed       DULoxetine (CYMBALTA) 60 MG capsule Take 2 capsules (120 mg) by mouth daily       methadone (DOLPHINE) 1 mg/mL SOLN Take 150 mLs (150 mg) by mouth daily  0     SENNA-docusate sodium (SENNA S) 8.6-50 MG tablet Take 1 tablet by mouth 2 times daily as needed       omeprazole (PRILOSEC OTC) 20 MG EC tablet Take 2 tablets (40 mg) by mouth daily          Social History      Recent travel for fising    History   Smoking Status     Current Every Day Smoker     Packs/day: 0.10     Years: 50.00     Types: Cigarettes   Smokeless Tobacco     Never Used          Allergies   Allergen Reactions     No Known Allergies        PFSH updated w/ recent travel    Physical Exam        Physical Exam:     Vitals:    06/03/19 0823   BP: 116/76   Pulse: 84   Resp: 20   Temp: 97.4  F (36.3  C)   SpO2: 98%   Weight: 80.8 kg (178 lb 3.2 oz)   Height: 1.702 m (5' 7\")     Body mass index is 27.91 kg/m .    General: Awake, seated comfortably, appears well and NAD   HEENT: PEERL  CV: RRR, normal S1/S2, no murmurs or rubs, Radial and DP pulses 2/4   Pulm: Normal WOB, lungs CTAB, no " wheezes or crackles, no pleural rub, good air movement   MSK: No joint swelling, neck ROM full throughout  Neuro: CN II-XII grossly intact, 5/5 strength in extremities throughout, gait normal   Psych: Alert, answering questions appropriately, normal thought processes; PHQ9 = 4   Skin: Bulls-eye lesion of medial right elbow        There are no discontinued medications.    Patient Instructions   Patient Instructions   Thank you for coming in today    Start the doxycycline 100mg twice a day. Take this for the next 21 days    Return to clinic if you have worsening headaches, neck stiffness, nausea and vomiting, chest pains or shortness of breath, or joint pains.    Otherwise return in about 4 weeks to see how the treatment has worked.           This note was completed with the assistance of dictation software. Typos and word substitution-errors are expected and unintended.

## 2019-06-28 ENCOUNTER — OFFICE VISIT (OUTPATIENT)
Dept: FAMILY MEDICINE | Facility: CLINIC | Age: 64
End: 2019-06-28
Payer: COMMERCIAL

## 2019-06-28 ENCOUNTER — TELEPHONE (OUTPATIENT)
Dept: FAMILY MEDICINE | Facility: CLINIC | Age: 64
End: 2019-06-28

## 2019-06-28 VITALS
RESPIRATION RATE: 18 BRPM | BODY MASS INDEX: 27.25 KG/M2 | TEMPERATURE: 98.4 F | WEIGHT: 174 LBS | HEART RATE: 91 BPM | SYSTOLIC BLOOD PRESSURE: 118 MMHG | OXYGEN SATURATION: 99 % | DIASTOLIC BLOOD PRESSURE: 86 MMHG

## 2019-06-28 DIAGNOSIS — A69.20 LYME DISEASE: Primary | ICD-10-CM

## 2019-06-28 DIAGNOSIS — T30.4 CHEMICAL BURN: ICD-10-CM

## 2019-06-28 DIAGNOSIS — Z00.00 HEALTHCARE MAINTENANCE: ICD-10-CM

## 2019-06-28 RX ORDER — TRIAMCINOLONE ACETONIDE 0.25 MG/G
OINTMENT TOPICAL 2 TIMES DAILY
Qty: 80 G | Refills: 0 | Status: SHIPPED | OUTPATIENT
Start: 2019-06-28

## 2019-06-28 NOTE — PROGRESS NOTES
Assessment and Plan   1. Lyme disease  Patient still having some headaches, but getting more infrequent. No other neurological or cardiac complaints - adequately treated with 21 days of Doxy.    2. Chemical burn  Due to not using gloves with plant food.  - triamcinolone (KENALOG) 0.025 % external ointment; Apply topically 2 times daily  Dispense: 80 g; Refill: 0    3. Healthcare maintenance  Strongly encouraged patient to get colonoscopy (never has had) and he will continue to think about it. Offered FIT testing, but he would prefer to eventually do colonoscopy - discussed natural course of disease and how sooner is better d/t this.    Follow up for yearly physical, sooner if worsening symptoms or rash doesn't resolve.     Options for treatment and follow-up care were reviewed with the patient and/or guardian. Arley Marrero and/or guardian engaged in the decision making process and verbalized understanding of the options discussed and agreed with the final plan.    Stefan Salgado MD  [facility], PGY-2    Precepted patient with Dr. Sage Pérez       HPI:   Arley Marrero is a 64 year old male who presents to clinic today for   Chief Complaint   Patient presents with     RECHECK     f/u Lyme Disease     Derm Problem     Pt stated that he was told to stay out of the sun while taking antibiotics (Doxycycline). Pt was mowing the lawn on 6/20/19 and red spots and blisters appeared on both hands      Medication Reconciliation     Completed     Patient has finished his course of Doxy and developed a new rash on his hands and lower forearms [place]. He was handling some plant food without gloves. Intermittently itches, burns under warm water. Never had anything like this before.    In terms of his Lyme's, he is still getting some HA - almost everyday. Tylenol helps. No cardiac complaints such as  palpitations or chest pain. No new areas of target rash.         Review of Systems:     A complete 12 point ROS was negative unless otherwise noted in HPI.          PMHX:   Active Problems List  Patient Active Problem List   Diagnosis     Viral hepatitis C     Tobacco abuse     Dupuytren's contracture of hand     Depression     Chronic lumbar pain     Pulmonary nodules     Insomnia, unspecified type     Gastroesophageal reflux disease, esophagitis presence not specified     Constipation, unspecified constipation type     Hyperlipidemia LDL goal <70     Encounter for abdominal aortic aneurysm (AAA) screening     Active problem list reviewed and updated.    Current Medications  Current Outpatient Medications   Medication Sig Dispense Refill     atorvastatin (LIPITOR) 40 MG tablet Take 1 tablet (40 mg) by mouth daily 90 tablet 0     diphenhydrAMINE (BENADRYL) 50 MG capsule Take 1 capsule (50 mg) by mouth nightly as needed for sleep       doxepin HCl (SINEQUAN) 150 MG capsule Take 10-20 mg by mouth nightly as needed       DULoxetine (CYMBALTA) 60 MG capsule Take 2 capsules (120 mg) by mouth daily       methadone (DOLPHINE) 1 mg/mL SOLN Take 150 mLs (150 mg) by mouth daily  0     omeprazole (PRILOSEC OTC) 20 MG EC tablet Take 2 tablets (40 mg) by mouth daily       SENNA-docusate sodium (SENNA S) 8.6-50 MG tablet Take 1 tablet by mouth 2 times daily as needed       triamcinolone (KENALOG) 0.025 % external ointment Apply topically 2 times daily 80 g 0     Medication list reviewed and updated.    Social History  Social History     Tobacco Use     Smoking status: Current Every Day Smoker     Packs/day: 0.10     Years: 50.00     Pack years: 5.00     Types: Cigarettes     Smokeless tobacco: Never Used   Substance Use Topics     Alcohol use: Not Currently     Drug use: Not Currently     History   Drug Use Unknown     Social history reviewed and updated.    Family History  Family History   Problem Relation Age of Onset      Cerebrovascular Disease Mother      Diabetes Brother      Family history reviewed and updated.    Allergies  Allergies   Allergen Reactions     No Known Allergies      Allergies and medication intolerances reviewed and updated.         Physical Exam:     Vitals:    06/28/19 1447   BP: 118/86   Pulse: 91   Resp: 18   Temp: 98.4  F (36.9  C)   TempSrc: Oral   SpO2: 99%   Weight: 78.9 kg (174 lb)     Body mass index is 27.25 kg/m .    GENERAL APPEARANCE: alert, appears stated age, no acute distress  HEENT: Eyes grossly normal to inspection, nares normal, and mouth and throat without erythema, ulcers, or lesions  RESP: lungs clear to auscultation - no rales, rhonchi, or wheezes  CV: regular rate and rhythm, no murmur, click, rub, or gallop  ABDOMEN: soft, nontender   MSK: extremities normal, no gross deformities noted, no lower extremity edema  SKIN: Erythematous, macular rash bilateral dorsal hands  NEURO: Normal strength and tone, sensory exam grossly normal, mentation appears intact and speech normal  PSYCH: mood and affect normal/bright

## 2019-06-28 NOTE — PATIENT INSTRUCTIONS
Thank you for coming to clinic today, it has been my pleasure to serve you.    Your current medication list is printed and included with this document.  Please keep this list with you - it is helpful to bring this current list to any other medical appointments and if you ever go to the emergency room or hospital.    If you had lab testing today or will be undergoing other testing outside the clinic such as an imaging test, we will be sharing the results with you by mail, phone call, or MyChart whenever they are available and I have reviewed them.     The phone number we would call with results is # [phone] (home) . If this is not the best number to reach you, please call our clinic to have this updated.    If you need any refills, please call your pharmacy and they will contact us.    If you have any further concerns or wish to schedule another appointment, please call our office at [phone] during normal business hours (8AM-5PM, Monday-Friday)    Another useful resource we offer our patients here at [facility] is our after hours care line. If you ever have questions or concerns outside of regular business hours and are unsure if you should seek care at an Urgent Care or in the Emergency Department, one of our physicians is on-call 24 hours a day to assist you.  Just call our regular clinic number at [phone] and a staff member will page the on-call physician with your concern.  This physician will attempt to return your call promptly although they are also working in the hospital and other urgent medical tasks may delay this call back.    If you have a medical emergency, please call [phone].    Thank you for coming to [facility], I look forward to caring for you in the future!    Take care,    Stefan Salgado MD

## 2019-07-02 NOTE — PROGRESS NOTES
I have personally reviewed the history and examination as documented by Dr. Salgado.  I was present during key portions of the visit and agree with the assessment and plan as documented for 64 yr old male with hx of lyme dz here for chemical burn of hand.  Tx given. Precautions given. Anticipatory guidance given. Colon Ca screening recommendations reviewed.    Sage Pérez MD  July 2, 2019  5:28 PM

## 2019-07-17 ENCOUNTER — OFFICE VISIT (OUTPATIENT)
Dept: FAMILY MEDICINE | Facility: CLINIC | Age: 64
End: 2019-07-17
Payer: COMMERCIAL

## 2019-07-17 DIAGNOSIS — Z53.9 ERRONEOUS ENCOUNTER--DISREGARD: Primary | ICD-10-CM

## 2019-07-18 DIAGNOSIS — E78.5 HYPERLIPIDEMIA LDL GOAL <70: ICD-10-CM

## 2019-07-18 RX ORDER — ATORVASTATIN CALCIUM 40 MG/1
40 TABLET, FILM COATED ORAL DAILY
Qty: 90 TABLET | Refills: 3 | Status: SHIPPED | OUTPATIENT
Start: 2019-07-18

## 2019-07-29 ENCOUNTER — TELEPHONE (OUTPATIENT)
Dept: FAMILY MEDICINE | Facility: CLINIC | Age: 64
End: 2019-07-29

## 2019-07-29 NOTE — TELEPHONE ENCOUNTER
Called patient and advised to continue taking atorvastatin. Also advised refill request was sent from pharmacy on 07/18 and approved. Requested patient call pharmacy to confirm prescription is ready for pickup. Patient verbalized understanding. Alex MAJOR

## 2019-07-29 NOTE — TELEPHONE ENCOUNTER
[facility] phone call message- medication clarification/question:    Full Medication Name: Atorvasatin   Strength: 40 mg    Have you contacted your pharmacy about this refill request? no    If  Yes,  which pharmacy?    When did you contact the pharmacy?    Additional comments/concerns from call to pharmacy:    Reason for call to clinic: Patient is calling stating when he was last in he forgot to ask the Doctor if he was suppose to still continue taking this medication? If he is, then he will need a refill because he is out of medication, he wasn't sure if he was suppose to continue taking it or not. Please call and advise.       Pharmacy confirmed as    [id number] - [place], [place] - [address]: Yes    OK to leave a message on voice mail?     Primary language: English      needed? No    Call taken on July 29, 2019 at 8:47 AM by Lisandro Matthews

## 2021-06-03 ENCOUNTER — RECORDS - HEALTHEAST (OUTPATIENT)
Dept: ADMINISTRATIVE | Facility: CLINIC | Age: 66
End: 2021-06-03

## 2022-02-17 PROBLEM — K21.9 GASTROESOPHAGEAL REFLUX DISEASE: Status: ACTIVE | Noted: 2019-05-15
